# Patient Record
Sex: FEMALE | Race: WHITE | URBAN - METROPOLITAN AREA
[De-identification: names, ages, dates, MRNs, and addresses within clinical notes are randomized per-mention and may not be internally consistent; named-entity substitution may affect disease eponyms.]

---

## 2024-02-01 ENCOUNTER — OV NP (OUTPATIENT)
Dept: URBAN - METROPOLITAN AREA CLINIC 63 | Facility: CLINIC | Age: 79
End: 2024-02-01
Payer: MEDICARE

## 2024-02-01 VITALS
HEIGHT: 66 IN | SYSTOLIC BLOOD PRESSURE: 134 MMHG | HEART RATE: 77 BPM | BODY MASS INDEX: 28.93 KG/M2 | TEMPERATURE: 97.3 F | WEIGHT: 180 LBS | OXYGEN SATURATION: 97 % | DIASTOLIC BLOOD PRESSURE: 68 MMHG

## 2024-02-01 DIAGNOSIS — K52.832 LYMPHOCYTIC COLITIS: ICD-10-CM

## 2024-02-01 DIAGNOSIS — Z86.010 HISTORY OF COLON POLYPS: ICD-10-CM

## 2024-02-01 DIAGNOSIS — K57.90 DIVERTICULOSIS: ICD-10-CM

## 2024-02-01 PROBLEM — 397881000: Status: ACTIVE | Noted: 2024-02-01

## 2024-02-01 PROBLEM — 1187071008: Status: ACTIVE | Noted: 2024-02-01

## 2024-02-01 PROBLEM — 428283002: Status: ACTIVE | Noted: 2024-02-01

## 2024-02-01 PROCEDURE — 99204 OFFICE O/P NEW MOD 45 MIN: CPT | Performed by: INTERNAL MEDICINE

## 2024-02-01 RX ORDER — ATORVASTATIN CALCIUM 20 MG/1
TABLET, FILM COATED ORAL
Qty: 90 TABLET | Status: ACTIVE | COMMUNITY

## 2024-02-01 RX ORDER — LATANOPROST 50 UG/ML
INSTILL 1 DROP INTO EACH EYE AT NIGHT SOLUTION/ DROPS OPHTHALMIC
Qty: 9 MILLILITER | Refills: 0 | Status: ACTIVE | COMMUNITY

## 2024-02-01 RX ORDER — BUDESONIDE 3 MG/1
1 CAPSULE CAPSULE ORAL ONCE A DAY
Qty: 30 CAPSULE | Refills: 3 | OUTPATIENT
Start: 2024-02-01

## 2024-02-01 RX ORDER — FAMOTIDINE 20 MG/1
1 TABLET AT BEDTIME AS NEEDED TABLET, FILM COATED ORAL ONCE A DAY
Status: ACTIVE | COMMUNITY

## 2024-02-01 RX ORDER — CELECOXIB 200 MG/1
CAPSULE ORAL
Qty: 180 CAPSULE | Status: ACTIVE | COMMUNITY

## 2024-02-01 RX ORDER — BUDESONIDE 3 MG/1
2 CAPSULES CAPSULE ORAL THREE TIMES A DAY
Status: ACTIVE | COMMUNITY

## 2024-02-01 NOTE — HPI-TODAY'S VISIT:
Ana María is a 77 y/o female that presents today as a new patient. History of lymphocytic colitis, colon polyps, GERD. She has been on Budesnoide for the lymphocytic colitis. Last colonoscopy in 2019. Colonoscopy revealed colon polyps, diverticulosis, and hemorrhoids. Colonoscopoy prior to this was in 2017 which also revealed colon polyps. On average, moves her bowels once daily. Off of the Budesonide, she was having diarrhea. Symptoms improved initially on Budesonide when she was first diagnosed, but then recurred. When her symtoms returned, she was restarted on the Budesonide. Denies frequent diarrhea at this time. Denies blood in stool. Denies abdominal pain. Denies nausea or vomiting. Denies weight loss.

## 2024-05-17 ENCOUNTER — OFFICE VISIT (OUTPATIENT)
Dept: URBAN - METROPOLITAN AREA CLINIC 63 | Facility: CLINIC | Age: 79
End: 2024-05-17

## 2024-05-23 ENCOUNTER — OFFICE VISIT (OUTPATIENT)
Dept: URBAN - METROPOLITAN AREA SURGERY CENTER 4 | Facility: SURGERY CENTER | Age: 79
End: 2024-05-23

## 2024-06-13 ENCOUNTER — OFFICE VISIT (OUTPATIENT)
Dept: URBAN - METROPOLITAN AREA CLINIC 63 | Facility: CLINIC | Age: 79
End: 2024-06-13
Payer: MEDICARE

## 2024-06-13 ENCOUNTER — OFFICE VISIT (OUTPATIENT)
Dept: URBAN - METROPOLITAN AREA CLINIC 63 | Facility: CLINIC | Age: 79
End: 2024-06-13

## 2024-06-13 ENCOUNTER — DASHBOARD ENCOUNTERS (OUTPATIENT)
Age: 79
End: 2024-06-13

## 2024-06-13 VITALS
OXYGEN SATURATION: 98 % | SYSTOLIC BLOOD PRESSURE: 110 MMHG | DIASTOLIC BLOOD PRESSURE: 70 MMHG | BODY MASS INDEX: 29.57 KG/M2 | TEMPERATURE: 97.8 F | HEIGHT: 66 IN | HEART RATE: 77 BPM | WEIGHT: 184 LBS

## 2024-06-13 DIAGNOSIS — K52.832 LYMPHOCYTIC COLITIS: ICD-10-CM

## 2024-06-13 DIAGNOSIS — Z86.010 HISTORY OF COLON POLYPS: ICD-10-CM

## 2024-06-13 DIAGNOSIS — K57.90 DIVERTICULOSIS: ICD-10-CM

## 2024-06-13 PROCEDURE — 99214 OFFICE O/P EST MOD 30 MIN: CPT | Performed by: INTERNAL MEDICINE

## 2024-06-13 RX ORDER — BUDESONIDE 3 MG/1
2 CAPSULES CAPSULE ORAL THREE TIMES A DAY
Status: ACTIVE | COMMUNITY

## 2024-06-13 RX ORDER — FAMOTIDINE 20 MG/1
1 TABLET AT BEDTIME AS NEEDED TABLET, FILM COATED ORAL ONCE A DAY
COMMUNITY

## 2024-06-13 RX ORDER — LATANOPROST 50 UG/ML
INSTILL 1 DROP INTO EACH EYE AT NIGHT SOLUTION/ DROPS OPHTHALMIC
Qty: 9 MILLILITER | Refills: 0 | Status: ACTIVE | COMMUNITY

## 2024-06-13 RX ORDER — BUDESONIDE 3 MG/1
1 CAPSULE CAPSULE ORAL ONCE A DAY
Qty: 90 CAPSULE | Refills: 3 | OUTPATIENT

## 2024-06-13 RX ORDER — LATANOPROST 50 UG/ML
INSTILL 1 DROP INTO EACH EYE AT NIGHT SOLUTION/ DROPS OPHTHALMIC
Qty: 9 MILLILITER | Refills: 0 | COMMUNITY

## 2024-06-13 RX ORDER — BUDESONIDE 3 MG/1
1 CAPSULE CAPSULE ORAL ONCE A DAY
Qty: 30 CAPSULE | Refills: 3 | COMMUNITY
Start: 2024-02-01

## 2024-06-13 RX ORDER — ATORVASTATIN CALCIUM 20 MG/1
TABLET, FILM COATED ORAL
Qty: 90 TABLET | Status: ACTIVE | COMMUNITY

## 2024-06-13 RX ORDER — CELECOXIB 200 MG/1
CAPSULE ORAL
Qty: 180 CAPSULE | Status: ACTIVE | COMMUNITY

## 2024-06-13 RX ORDER — ATORVASTATIN CALCIUM 20 MG/1
TABLET, FILM COATED ORAL
Qty: 90 TABLET | COMMUNITY

## 2024-06-13 RX ORDER — FAMOTIDINE 20 MG/1
1 TABLET AT BEDTIME AS NEEDED TABLET, FILM COATED ORAL ONCE A DAY
Status: ACTIVE | COMMUNITY

## 2024-06-13 RX ORDER — BUDESONIDE 3 MG/1
2 CAPSULES CAPSULE ORAL THREE TIMES A DAY
COMMUNITY

## 2024-06-13 RX ORDER — BUDESONIDE 3 MG/1
1 CAPSULE CAPSULE ORAL ONCE A DAY
Qty: 30 CAPSULE | Refills: 3 | Status: ACTIVE | COMMUNITY
Start: 2024-02-01

## 2024-06-13 RX ORDER — CELECOXIB 200 MG/1
CAPSULE ORAL
Qty: 180 CAPSULE | COMMUNITY

## 2024-06-13 NOTE — HPI-TODAY'S VISIT:
Is a pleasant 78-year-old female with history of colon polyps, lymphocytic colitis and acid reflux who is here today in follow-up. She was previously seen by Dr. Goff and is here to establish her care with me. Ronel is here today in follow-up. She is doing fairly well on 3 mg of budesonide. She has formed stools. Denies any abdominal pain or nausea. Denies any reflux or dysphagia. Reported no rectal bleeding or melena or any unintentional weight loss or loss of appetite. Diagnosed with lymphocytic colitis in 2019. Apparently when she tapers off the medication her diarrhea recurs. She is going up north to [place] to attend her grand daughter's wedding and will be back in October when she is agreeable for colonoscopy. She denies any family history of celiac disease. No culprit food items. Denies any excessive dairy intake. Denies NSAID use. Does report a history of osteoporosis. We discussed about the adverse effects of budesonide And I recommended that she take budesonide every other day and if she experiences no recurrence of her diarrhea then she could taper to taking it Monday Wednesday and Friday.    Last colonoscopy in 2019. Colonoscopy revealed colon polyps, diverticulosis, and hemorrhoids. Colonoscopoy prior to this was in 2017 which also revealed colon polyps.

## 2024-10-17 ENCOUNTER — OFFICE VISIT (OUTPATIENT)
Dept: URBAN - METROPOLITAN AREA CLINIC 63 | Facility: CLINIC | Age: 79
End: 2024-10-17
Payer: MEDICARE

## 2024-10-17 VITALS
TEMPERATURE: 98 F | HEART RATE: 73 BPM | BODY MASS INDEX: 29.44 KG/M2 | DIASTOLIC BLOOD PRESSURE: 70 MMHG | OXYGEN SATURATION: 97 % | HEIGHT: 66 IN | WEIGHT: 183.2 LBS | SYSTOLIC BLOOD PRESSURE: 120 MMHG

## 2024-10-17 DIAGNOSIS — U07.1 COVID-19: ICD-10-CM

## 2024-10-17 DIAGNOSIS — K57.90 DIVERTICULOSIS: ICD-10-CM

## 2024-10-17 DIAGNOSIS — Z86.0100 HISTORY OF COLON POLYPS: ICD-10-CM

## 2024-10-17 DIAGNOSIS — K52.832 LYMPHOCYTIC COLITIS: ICD-10-CM

## 2024-10-17 PROBLEM — 89627008: Status: ACTIVE | Noted: 2024-10-17

## 2024-10-17 PROBLEM — 840539006: Status: ACTIVE | Noted: 2024-10-17

## 2024-10-17 PROBLEM — 49436004: Status: ACTIVE | Noted: 2024-10-17

## 2024-10-17 PROBLEM — 77176002: Status: ACTIVE | Noted: 2024-10-17

## 2024-10-17 PROCEDURE — 99214 OFFICE O/P EST MOD 30 MIN: CPT | Performed by: INTERNAL MEDICINE

## 2024-10-17 RX ORDER — FAMOTIDINE 20 MG/1
1 TABLET AT BEDTIME AS NEEDED TABLET, FILM COATED ORAL ONCE A DAY
Status: ACTIVE | COMMUNITY

## 2024-10-17 RX ORDER — BUDESONIDE 3 MG/1
2 CAPSULES CAPSULE ORAL THREE TIMES A DAY
Status: ACTIVE | COMMUNITY

## 2024-10-17 RX ORDER — ATORVASTATIN CALCIUM 20 MG/1
TABLET, FILM COATED ORAL
Qty: 90 TABLET | Status: ACTIVE | COMMUNITY

## 2024-10-17 RX ORDER — LATANOPROST 50 UG/ML
INSTILL 1 DROP INTO EACH EYE AT NIGHT SOLUTION/ DROPS OPHTHALMIC
Qty: 9 MILLILITER | Refills: 0 | Status: ACTIVE | COMMUNITY

## 2024-10-17 RX ORDER — CELECOXIB 200 MG/1
CAPSULE ORAL
Qty: 180 CAPSULE | Status: ACTIVE | COMMUNITY

## 2024-10-17 NOTE — HPI-TODAY'S VISIT:
Is a pleasant 78-year-old female with history of colon polyps, lymphocytic colitis and acid reflux who is here today in follow-up. She was previously seen by Dr. Goff and saw me in jun 2024 .  Ronel is here today in follow-up. Reviewed her records extensively. She was admitted with shortness of breath in July 2024. Diagnosed with COVID. Treated with remdesivir and Decadron. Found to be hyponatremic secondary to hydrochlorothiazide. Hyponatremia improved with discontinuation of hydrochlorothiazide and fluid restriction. Developed atrial fibrillation with rapid ventricular rate and currently is on anticoagulation with Xarelto.  She continues to take 3 mg of budesonide for lymphocytic colitis and has 1-2 bowel movements per day mostly formed. She denies any rectal bleeding or melena. She reports no reflux symptoms dysphagia early satiety bloating. Or any abdominal pain. She has appointments to see her cardiologist and is also establishing with a pulmonologist Dr. Hager in a week. Will order labs to look for resolution of her hyponatremia. She still smokes 1 to 2 cigarettes/day. She does not drink alcohol   Diagnosed with lymphocytic colitis in 2019. . She denies any family history of celiac disease. No culprit food items. Denies any excessive dairy intake. Denies NSAID use. Does report a history of osteoporosis. We discussed about the adverse effects of budesonide.  And I recommended that she take budesonide every other day and if she experiences no recurrence of her diarrhea then she could taper to taking it Monday Wednesday and Friday.    Last colonoscopy in 2019. Colonoscopy revealed colon polyps, diverticulosis, and hemorrhoids. Colonoscopoy prior to this was in 2017 which also revealed colon polyps.

## 2024-10-17 NOTE — HPI-PREVIOUS IMAGING
Chest x-ray negative in July 2024  CT angiogram negative for PE, normal esophagus, calcified lesion in the right thyroid lobe

## 2024-10-17 NOTE — HPI-PREVIOUS LABS
Labs July 2024: WBC 12.2 (H), hemoglobin 13.1 g MCV and 89, platelets 391 Sodium 132 (previously 120), normal potassium, BUN 11 creatinine 0.5, serum albumin 3.6, normal liver enzymes,

## 2024-10-28 ENCOUNTER — TELEPHONE ENCOUNTER (OUTPATIENT)
Dept: URBAN - METROPOLITAN AREA CLINIC 63 | Facility: CLINIC | Age: 79
End: 2024-10-28

## 2024-11-11 ENCOUNTER — TELEPHONE ENCOUNTER (OUTPATIENT)
Dept: URBAN - METROPOLITAN AREA CLINIC 63 | Facility: CLINIC | Age: 79
End: 2024-11-11

## 2024-11-26 ENCOUNTER — TELEPHONE ENCOUNTER (OUTPATIENT)
Dept: URBAN - METROPOLITAN AREA CLINIC 60 | Facility: CLINIC | Age: 79
End: 2024-11-26

## 2024-12-03 ENCOUNTER — TELEPHONE ENCOUNTER (OUTPATIENT)
Dept: URBAN - METROPOLITAN AREA CLINIC 63 | Facility: CLINIC | Age: 79
End: 2024-12-03

## 2025-01-17 ENCOUNTER — OFFICE VISIT (OUTPATIENT)
Dept: URBAN - METROPOLITAN AREA CLINIC 63 | Facility: CLINIC | Age: 80
End: 2025-01-17
Payer: MEDICARE

## 2025-01-17 VITALS
WEIGHT: 186 LBS | DIASTOLIC BLOOD PRESSURE: 80 MMHG | RESPIRATION RATE: 16 BRPM | HEART RATE: 77 BPM | SYSTOLIC BLOOD PRESSURE: 130 MMHG | TEMPERATURE: 97.2 F | BODY MASS INDEX: 29.89 KG/M2 | HEIGHT: 66 IN | OXYGEN SATURATION: 94 %

## 2025-01-17 DIAGNOSIS — E87.1 HYPONATREMIA: ICD-10-CM

## 2025-01-17 DIAGNOSIS — E66.3 OVERWEIGHT: ICD-10-CM

## 2025-01-17 DIAGNOSIS — K52.832 LYMPHOCYTIC COLITIS: ICD-10-CM

## 2025-01-17 DIAGNOSIS — U07.1 COVID-19: ICD-10-CM

## 2025-01-17 DIAGNOSIS — K57.90 DIVERTICULOSIS: ICD-10-CM

## 2025-01-17 DIAGNOSIS — I48.91 A-FIB: ICD-10-CM

## 2025-01-17 DIAGNOSIS — Z86.0100 HISTORY OF COLON POLYPS: ICD-10-CM

## 2025-01-17 PROBLEM — 238131007: Status: ACTIVE | Noted: 2025-01-17

## 2025-01-17 PROCEDURE — 99214 OFFICE O/P EST MOD 30 MIN: CPT | Performed by: INTERNAL MEDICINE

## 2025-01-17 RX ORDER — LATANOPROST 50 UG/ML
INSTILL 1 DROP INTO EACH EYE AT NIGHT SOLUTION/ DROPS OPHTHALMIC
Qty: 9 MILLILITER | Refills: 0 | Status: ACTIVE | COMMUNITY

## 2025-01-17 RX ORDER — FAMOTIDINE 20 MG/1
1 TABLET AT BEDTIME AS NEEDED TABLET, FILM COATED ORAL ONCE A DAY
Status: ACTIVE | COMMUNITY

## 2025-01-17 RX ORDER — METOPROLOL TARTRATE 25 MG/1
1 TABLET WITH FOOD TABLET, FILM COATED ORAL TWICE A DAY
Status: ACTIVE | COMMUNITY

## 2025-01-17 RX ORDER — ATORVASTATIN CALCIUM 20 MG/1
TABLET, FILM COATED ORAL
Qty: 90 TABLET | Status: ACTIVE | COMMUNITY

## 2025-01-17 RX ORDER — BUDESONIDE 3 MG/1
2 CAPSULES CAPSULE ORAL THREE TIMES A DAY
Status: ACTIVE | COMMUNITY

## 2025-01-17 RX ORDER — RIVAROXABAN 10 MG/1
1 TABLET WITH FOOD TABLET, FILM COATED ORAL ONCE A DAY
Status: ACTIVE | COMMUNITY

## 2025-01-17 NOTE — HPI-TODAY'S VISIT:
Is a pleasant 78-year-old female with history of colon polyps, lymphocytic colitis and acid reflux who is here today in follow-up. She was previously seen by Dr. Goff and saw me in jun 2024 .  Ana María is here today in follow-up. She has a history of lymphocytic colitis. During her last visit we talked about doing the budesonide every other day but apparently this regimen led to worsening diarrhea. She then went back to taking 3 mg every day and is doing fairly well on this current regimen. She understands the risks of osteoporosis. She is on Fosamax. We discussed about treating her with Remicade and Humira etc. but she read about the adverse effects and declined. She is otherwise doing well. She denies any reflux symptoms on the famotidine. Denies any dysphagia. Denies any early satiety or bloating. She stopped Celebrex. She is on Xarelto for A-fib. She denies any rectal bleeding melena or any unintentional weight loss loss of appetite. Reviewed recent labs During her last visit I ordered a Cologuard test. Apparently there was some issues and she ended up not getting the Cologuard box from the company. I spoke with our office staff and she was assured that she will get the box in the mail. In the event she does not get the box she said that she will request her primary care physician Dr. Salazar to order one.  QUIT SMOKING IN OCT 2024  LAST VISIT OCT 2024  Ronel is here today in follow-up. Reviewed her records extensively. She was admitted with shortness of breath in July 2024. Diagnosed with COVID. Treated with remdesivir and Decadron. Found to be hyponatremic secondary to hydrochlorothiazide. Hyponatremia improved with discontinuation of hydrochlorothiazide and fluid restriction. Developed atrial fibrillation with rapid ventricular rate and currently is on anticoagulation with Xarelto.  She continues to take 3 mg of budesonide for lymphocytic colitis and has 1-2 bowel movements per day mostly formed.  She has appointments to see her cardiologist and is also establishing with a pulmonologist Dr. Hager in a week. She still smokes 1 to 2 cigarettes/day. She does not drink alcohol  Diagnosed with lymphocytic colitis in 2019. . She denies any family history of celiac disease. No culprit food items. Denies any excessive dairy intake. Denies NSAID use. Does report a history of osteoporosis. We discussed about the adverse effects of budesonide. Last colonoscopy in 2019. Colonoscopy revealed colon polyps, diverticulosis, and hemorrhoids. Colonoscopoy prior to this was in 2017 which also revealed colon polyps.

## 2025-01-17 NOTE — HPI-PREVIOUS LABS
Labs October 2024: WBC 6.5, hemoglobin 13.1 g, MCV 98, platelets 282 BUN 15, creatinine 0.6, normal electrolytes, serum albumin 3.9, normal liver enzymes   Labs July 2024: WBC 12.2 (H), hemoglobin 13.1 g MCV and 89, platelets 391 Sodium 132 (previously 120), normal potassium, BUN 11 creatinine 0.5, serum albumin 3.6, normal liver enzymes,

## 2025-02-11 ENCOUNTER — TELEPHONE ENCOUNTER (OUTPATIENT)
Dept: URBAN - METROPOLITAN AREA CLINIC 6 | Facility: CLINIC | Age: 80
End: 2025-02-11

## 2025-03-31 ENCOUNTER — LAB OUTSIDE AN ENCOUNTER (OUTPATIENT)
Dept: URBAN - METROPOLITAN AREA CLINIC 61 | Facility: CLINIC | Age: 80
End: 2025-03-31

## 2025-03-31 ENCOUNTER — TELEPHONE ENCOUNTER (OUTPATIENT)
Dept: URBAN - METROPOLITAN AREA CLINIC 61 | Facility: CLINIC | Age: 80
End: 2025-03-31

## 2025-04-11 ENCOUNTER — TELEPHONE ENCOUNTER (OUTPATIENT)
Dept: URBAN - METROPOLITAN AREA CLINIC 63 | Facility: CLINIC | Age: 80
End: 2025-04-11

## 2025-04-14 ENCOUNTER — TELEPHONE ENCOUNTER (OUTPATIENT)
Dept: URBAN - METROPOLITAN AREA CLINIC 63 | Facility: CLINIC | Age: 80
End: 2025-04-14

## 2025-07-15 ENCOUNTER — CLAIMS CREATED FROM THE CLAIM WINDOW (OUTPATIENT)
Dept: URBAN - METROPOLITAN AREA CLINIC 4 | Facility: CLINIC | Age: 80
End: 2025-07-15
Payer: MEDICARE

## 2025-07-15 ENCOUNTER — CLAIMS CREATED FROM THE CLAIM WINDOW (OUTPATIENT)
Dept: URBAN - METROPOLITAN AREA SURGERY CENTER 4 | Facility: SURGERY CENTER | Age: 80
End: 2025-07-15
Payer: MEDICARE

## 2025-07-15 ENCOUNTER — CLAIMS CREATED FROM THE CLAIM WINDOW (OUTPATIENT)
Dept: URBAN - METROPOLITAN AREA SURGERY CENTER 4 | Facility: SURGERY CENTER | Age: 80
End: 2025-07-15

## 2025-07-15 DIAGNOSIS — K62.1 RECTAL POLYP: ICD-10-CM

## 2025-07-15 DIAGNOSIS — K57.30 DIVERTICULOSIS OF LARGE INTESTINE WITHOUT PERFORATION OR ABSCESS WITHOUT BLEEDING: ICD-10-CM

## 2025-07-15 DIAGNOSIS — K64.1 SECOND DEGREE HEMORRHOIDS: ICD-10-CM

## 2025-07-15 DIAGNOSIS — D12.5 BENIGN NEOPLASM OF SIGMOID COLON: ICD-10-CM

## 2025-07-15 DIAGNOSIS — K63.5 COLON POLYP: ICD-10-CM

## 2025-07-15 DIAGNOSIS — K52.831 COLLAGENOUS COLITIS: ICD-10-CM

## 2025-07-15 DIAGNOSIS — K63.89 OTHER SPECIFIED DISEASES OF INTESTINE: ICD-10-CM

## 2025-07-15 DIAGNOSIS — Z12.11 ENCOUNTER FOR SCREENING FOR MALIGNANT NEOPLASM OF COLON: ICD-10-CM

## 2025-07-15 DIAGNOSIS — R19.5 POSITIVE COLORECTAL CANCER SCREENING USING COLOGUARD TEST: ICD-10-CM

## 2025-07-15 DIAGNOSIS — D12.0 BENIGN NEOPLASM OF CECUM: ICD-10-CM

## 2025-07-15 DIAGNOSIS — K63.5 BENIGN COLON POLYP: ICD-10-CM

## 2025-07-15 PROCEDURE — 88305 TISSUE EXAM BY PATHOLOGIST: CPT | Performed by: PATHOLOGY

## 2025-07-15 PROCEDURE — 45385 COLONOSCOPY W/LESION REMOVAL: CPT | Performed by: CLINIC/CENTER

## 2025-07-15 PROCEDURE — 00811 ANES LWR INTST NDSC NOS: CPT | Performed by: NURSE ANESTHETIST, CERTIFIED REGISTERED

## 2025-07-15 PROCEDURE — 45380 COLONOSCOPY AND BIOPSY: CPT | Performed by: INTERNAL MEDICINE

## 2025-07-15 PROCEDURE — 88313 SPECIAL STAINS GROUP 2: CPT | Performed by: PATHOLOGY

## 2025-07-15 PROCEDURE — 45385 COLONOSCOPY W/LESION REMOVAL: CPT | Performed by: INTERNAL MEDICINE

## 2025-07-15 PROCEDURE — 88342 IMHCHEM/IMCYTCHM 1ST ANTB: CPT | Performed by: PATHOLOGY

## 2025-07-15 PROCEDURE — 45380 COLONOSCOPY AND BIOPSY: CPT | Performed by: CLINIC/CENTER

## 2025-07-15 RX ORDER — METOPROLOL TARTRATE 25 MG/1
1 TABLET WITH FOOD TABLET, FILM COATED ORAL TWICE A DAY
Status: ACTIVE | COMMUNITY

## 2025-07-15 RX ORDER — FAMOTIDINE 20 MG/1
1 TABLET AT BEDTIME AS NEEDED TABLET, FILM COATED ORAL ONCE A DAY
Status: ACTIVE | COMMUNITY

## 2025-07-15 RX ORDER — ATORVASTATIN CALCIUM 20 MG/1
TABLET, FILM COATED ORAL
Qty: 90 TABLET | Status: ACTIVE | COMMUNITY

## 2025-07-15 RX ORDER — LATANOPROST 50 UG/ML
INSTILL 1 DROP INTO EACH EYE AT NIGHT SOLUTION/ DROPS OPHTHALMIC
Qty: 9 MILLILITER | Refills: 0 | Status: ACTIVE | COMMUNITY

## 2025-07-15 RX ORDER — BUDESONIDE 3 MG/1
2 CAPSULES CAPSULE ORAL THREE TIMES A DAY
Status: ACTIVE | COMMUNITY

## 2025-07-15 RX ORDER — RIVAROXABAN 10 MG/1
1 TABLET WITH FOOD TABLET, FILM COATED ORAL ONCE A DAY
Status: ACTIVE | COMMUNITY

## 2025-07-23 ENCOUNTER — TELEPHONE ENCOUNTER (OUTPATIENT)
Dept: URBAN - METROPOLITAN AREA CLINIC 63 | Facility: CLINIC | Age: 80
End: 2025-07-23

## 2025-07-29 ENCOUNTER — OFFICE VISIT (OUTPATIENT)
Dept: URBAN - METROPOLITAN AREA CLINIC 63 | Facility: CLINIC | Age: 80
End: 2025-07-29
Payer: MEDICARE

## 2025-07-29 DIAGNOSIS — E66.3 OVERWEIGHT: ICD-10-CM

## 2025-07-29 DIAGNOSIS — F17.200 CURRENT SMOKER: ICD-10-CM

## 2025-07-29 DIAGNOSIS — I48.91 ATRIAL FIBRILLATION, UNSPECIFIED TYPE: ICD-10-CM

## 2025-07-29 DIAGNOSIS — E73.9 LACTOSE INTOLERANCE: ICD-10-CM

## 2025-07-29 DIAGNOSIS — K57.90 DIVERTICULOSIS: ICD-10-CM

## 2025-07-29 DIAGNOSIS — U07.1 COVID-19: ICD-10-CM

## 2025-07-29 DIAGNOSIS — Z86.010 HISTORY OF COLON POLYPS: ICD-10-CM

## 2025-07-29 DIAGNOSIS — K52.832 LYMPHOCYTIC COLITIS: ICD-10-CM

## 2025-07-29 DIAGNOSIS — E87.1 HYPONATREMIA: ICD-10-CM

## 2025-07-29 PROCEDURE — 99214 OFFICE O/P EST MOD 30 MIN: CPT

## 2025-07-29 RX ORDER — METOPROLOL TARTRATE 25 MG/1
1 TABLET WITH FOOD TABLET, FILM COATED ORAL TWICE A DAY
Status: ACTIVE | COMMUNITY

## 2025-07-29 RX ORDER — FAMOTIDINE 20 MG/1
1 TABLET AT BEDTIME AS NEEDED TABLET, FILM COATED ORAL ONCE A DAY
Status: ACTIVE | COMMUNITY

## 2025-07-29 RX ORDER — ALENDRONATE SODIUM 70 MG/1
1 TABLET 30 MINUTES BEFORE THE FIRST FOOD, BEVERAGE OR MEDICINE OF THE DAY WITH PLAIN WATER TABLET ORAL
Status: ACTIVE | COMMUNITY

## 2025-07-29 RX ORDER — RIVAROXABAN 10 MG/1
1 TABLET WITH FOOD TABLET, FILM COATED ORAL ONCE A DAY
Status: ACTIVE | COMMUNITY

## 2025-07-29 RX ORDER — ATORVASTATIN CALCIUM 20 MG/1
TABLET, FILM COATED ORAL
Qty: 90 TABLET | Status: ACTIVE | COMMUNITY

## 2025-07-29 RX ORDER — LATANOPROST 50 UG/ML
INSTILL 1 DROP INTO EACH EYE AT NIGHT SOLUTION/ DROPS OPHTHALMIC
Qty: 9 MILLILITER | Refills: 0 | Status: ACTIVE | COMMUNITY

## 2025-07-29 RX ORDER — BUDESONIDE 3 MG/1
2 CAPSULES CAPSULE ORAL THREE TIMES A DAY
Status: ACTIVE | COMMUNITY

## 2025-07-29 RX ORDER — BUDESONIDE 3 MG/1
1 CAPSULE CAPSULE ORAL ONCE A DAY
Qty: 90 CAPSULE | Refills: 3 | OUTPATIENT
Start: 2025-07-29

## 2025-07-29 NOTE — HPI-TODAY'S VISIT:
Patient is a very pleasant 79-year-old female who presents for follow-up of colonoscopy.  She is a patient of Dr. Mae.  Last seen in office on 1/17/2025.  Past medical history significant for lymphocytic colitis, history of colon polyps, diverticulosis, COVID-19, nicotine use, atrial fibrillation (Xarelto), osteoporosis, arthritis, overweight.  Last colonoscopy 7/15/2025.  Family history noncontributory.  LOV with Dr. Mae 01/17/2025: Ana María is here today in follow-up. She has a history of lymphocytic colitis. During her last visit we talked about doing the budesonide every other day but apparently this regimen led to worsening diarrhea. She then went back to taking 3 mg every day and is doing fairly well on this current regimen. She understands the risks of osteoporosis. She is on Fosamax. We discussed about treating her with Remicade and Humira etc. but she read about the adverse effects and declined. She is otherwise doing well. She denies any reflux symptoms on the famotidine. Denies any dysphagia. Denies any early satiety or bloating. She stopped Celebrex. She is on Xarelto for A-fib. She denies any rectal bleeding melena or any unintentional weight loss loss of appetite. Reviewed recent labs During her last visit I ordered a Cologuard test. Apparently there was some issues and she ended up not getting the Cologuard box from the company. I spoke with our office staff and she was assured that she will get the box in the mail. In the event she does not get the box she said that she will request her primary care physician Dr. Salazar to order one.  QUIT SMOKING IN OCT 2024  OCT 2024: Ronel is here today in follow-up. Reviewed her records extensively. She was admitted with shortness of breath in July 2024. Diagnosed with COVID. Treated with remdesivir and Decadron. Found to be hyponatremic secondary to hydrochlorothiazide. Hyponatremia improved with discontinuation of hydrochlorothiazide and fluid restriction. Developed atrial fibrillation with rapid ventricular rate and currently is on anticoagulation with Xarelto.  She continues to take 3 mg of budesonide for lymphocytic colitis and has 1-2 bowel movements per day mostly formed.  She has appointments to see her cardiologist and is also establishing with a pulmonologist Dr. Hager in a week. She still smokes 1 to 2 cigarettes/day. She does not drink alcohol  Diagnosed with lymphocytic colitis in 2019. . She denies any family history of celiac disease. No culprit food items. Denies any excessive dairy intake. Denies NSAID use. Does report a history of osteoporosis. We discussed about the adverse effects of budesonide.   TODAY 07/29/2025: Ana María Feliz, a 79-year-old female, presented for management of her collagenous colitis after a recent severe flare of watery diarrhea and weakness following discontinuation of budesonide. She is concerned about the medication's long-term effects but finds her symptoms disruptive without it. She also manages lactose intolerance with dietary modifications and has a history of diverticulosis and colon polyps, recently confirmed by colonoscopy after a positive Cologuard test.

## 2025-07-29 NOTE — PHYSICAL EXAM HENT:
Head, normocephalic, atraumatic, Face, Face within normal limits, Ears, External ears within normal limits none

## 2025-07-29 NOTE — HPI-PREVIOUS PROCEDURES
coloscopy 2017 and 2019 - [facility]- lymphocytic colitis  7/15/2025 colonoscopy consistent with examined portion of ileum normal Small 4 to 6 mm polyp in cecum pathology consistent with tubular adenoma Medium 7 to 9 mm polyp in sigmoid colon pathology consistent with tubular adenoma 4 mm polyp in rectum pathology consistent with benign mucosal polyp Diverticulosis in sigmoid and rectosigmoid colon Normal mucosa throughout History of left specific colitis External and internal hemorrhoids Random colon biopsy consistent with collagenous colitis  Last colonoscopy in 2019. Colonoscopy revealed colon polyps, diverticulosis, and hemorrhoids. Colonoscopoy prior to this was in 2017 which also revealed colon polyps.

## 2025-08-29 ENCOUNTER — TELEPHONE ENCOUNTER (OUTPATIENT)
Dept: URBAN - METROPOLITAN AREA CLINIC 63 | Facility: CLINIC | Age: 80
End: 2025-08-29